# Patient Record
(demographics unavailable — no encounter records)

---

## 2024-11-14 NOTE — HISTORY OF PRESENT ILLNESS
[de-identified] : THADDEUS SIMPSON 35-year male Follow-up for bilateral knee pain after getting bilateral knee PRP injection on 09/11/24. Pt states his knees are stronger, and he has done 10 weeks of physical therapy and continue to go twice a week. He states physical therapy is going well. Pain is decreased .

## 2024-11-14 NOTE — PHYSICAL EXAM
[de-identified] : Bilateral knee exam some crepitance on extension but no tenderness and no effusion.  Quads now 5/5 to isometric testing both legs.

## 2024-11-14 NOTE — DISCUSSION/SUMMARY
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  Very impressed with his progress..  Will do 4 more weeks PT and then progress to tennis and see him back in 8 weeks.